# Patient Record
Sex: MALE | Race: WHITE
[De-identification: names, ages, dates, MRNs, and addresses within clinical notes are randomized per-mention and may not be internally consistent; named-entity substitution may affect disease eponyms.]

---

## 2019-02-19 ENCOUNTER — HOSPITAL ENCOUNTER (EMERGENCY)
Dept: HOSPITAL 61 - ER | Age: 19
Discharge: HOME | End: 2019-02-19
Payer: COMMERCIAL

## 2019-02-19 VITALS — HEIGHT: 70 IN | BODY MASS INDEX: 25.48 KG/M2 | WEIGHT: 178 LBS

## 2019-02-19 VITALS — DIASTOLIC BLOOD PRESSURE: 86 MMHG | SYSTOLIC BLOOD PRESSURE: 134 MMHG

## 2019-02-19 DIAGNOSIS — J02.9: ICD-10-CM

## 2019-02-19 DIAGNOSIS — F41.9: Primary | ICD-10-CM

## 2019-02-19 PROCEDURE — 99284 EMERGENCY DEPT VISIT MOD MDM: CPT

## 2019-02-19 NOTE — PHYS DOC
Past Medical History


Past Medical History:  No Pertinent History, Anxiety


Past Surgical History:  No Surgical History


Alcohol Use:  None


Drug Use:  None





Adult General


Chief Complaint


Chief Complaint:  SORE THROAT





HPI


HPI





Patient is a 19  year old male with history of anxiety who presents to the ED 

today complaining of what he believes is an abnormal growth on his throat that 

he noted recently. He states he took a picture of it. He showed me the picture, 

this is his epiglottis. Patient presents in the ED with the grandmother, they 

said there was seen at urgent care. Patient was checked for strep infection 

which was negative. Patient is very concerned about this "growth". He states 

his throat is almost dry. Patient appears anxious asking question after 

question about this "growth"





Off note i have seen this patient in the ED a month ago with chest pain and 

anxiety. Patient had full extensive workup which was negative. We reassured 

him. Recommended he follows up with [facility] all his own primary 

care doctor or psychiatrist for counseling as well as to be put on medications. 

Patient has not followed up. He is in the emergency room with the grandmother 

who also agrees patient has anxiety and needs help





Informed patient he does not need any further workup in the emergency room. He 

is swallowing breathing and in no distress. Informed him if he feels something 

unusual is growing in his throat I can do a CT with IV contrast of his throat 

but that involves needle stick for labs and imaging specifically CT, patient 

states he does not want any lab work specifically needlesticks.





Patient was provided a doctor's list and instructed to follow-up. Also 

encouraged to follow-up with [facility].





Review of Systems


Review of Systems





Constitutional: Denies fever or chills []


Eyes: Denies change in visual acuity, redness, or eye pain []


HENT: report something in the throat.Denies nasal congestion 


Respiratory: Denies cough or shortness of breath []


Cardiovascular: No additional information not addressed in HPI []


GI: Denies abdominal pain, nausea, vomiting, bloody stools or diarrhea []


: Denies dysuria or hematuria []


Musculoskeletal: Denies back pain or joint pain []


Integument: Denies rash or skin lesions []


Neurologic: Denies headache, focal weakness or sensory changes []








All other systems were reviewed and found to be within normal limits, except as 

documented in this note.





Allergies


Allergies





Allergies








Coded Allergies Type Severity Reaction Last Updated Verified


 


  No Known Drug Allergies    1/6/19 No











Physical Exam


Physical Exam





Constitutional: Well developed, well nourished, no acute distress, non-toxic 

appearance. []


HENT: Normocephalic, atraumatic, bilateral external ears normal, oropharynx 

moist, no oral exudates, nose normal. []


No abnormal growth noted in the area of concerned epiglottis is the thing he is 

concerned about.


Eyes: PERRLA, EOMI, conjunctiva normal, no discharge. [] 


Neck: Normal range of motion, no tenderness, supple, no stridor. [] 


Cardiovascular:Heart rate regular rhythm, no murmur []


Lungs & Thorax:  Bilateral breath sounds clear to auscultation []


Abdomen: Bowel sounds normal, soft, no tenderness, no masses, no pulsatile 

masses. [] 


Skin: Warm, dry, no erythema, no rash. [] 


Back: No tenderness, no CVA tenderness. [] 


Extremities: No tenderness, no cyanosis, no clubbing, ROM intact, no edema. [] 


Neurologic: Alert and oriented X 3, normal motor function, normal sensory 

function, no focal deficits noted. []


Psychologic: Affect normal, judgement normal, mood normal. []





EKG


EKG


[]





Radiology/Procedures


Radiology/Procedures


[]





Course & Med Decision Making


Course & Med Decision Making


Pertinent Labs and Imaging studies reviewed. (See chart for details)





see HPI





Dragon Disclaimer


Dragon Disclaimer


This electronic medical record was generated, in whole or in part, using a 

voice recognition dictation system.





Departure


Departure


Impression:  


 Primary Impression:  


 Anxiety


 Additional Impression:  


 Sore throat


Disposition:  01 HOME, SELF-CARE


Condition:  STABLE


Referrals:  


UNKNOWN PCP NAME (PCP)


follow up with your doctor next week


Patient Instructions:  Anxiety and Panic Attacks, Easy-to-Read, Sore Throat





Additional Instructions:  


You were evaluated in the emergency room, we highly encouraged you to establish 

care with a primary care doctor as well as follow-up a psychiatrist or 

[facility] as soon as you can. Consider hydrating yourself. You 

can use saltwater gargles as needed for sore throat. You can take Tylenol or 

Motrin for pain or fever.





Problem Qualifiers











LEONELA KRUGER Feb 19, 2019 15:18

## 2019-02-26 ENCOUNTER — HOSPITAL ENCOUNTER (EMERGENCY)
Dept: HOSPITAL 61 - ER | Age: 19
Discharge: HOME | End: 2019-02-26
Payer: COMMERCIAL

## 2019-02-26 VITALS — BODY MASS INDEX: 24.34 KG/M2 | HEIGHT: 70 IN | WEIGHT: 170 LBS

## 2019-02-26 VITALS — SYSTOLIC BLOOD PRESSURE: 127 MMHG | DIASTOLIC BLOOD PRESSURE: 69 MMHG

## 2019-02-26 DIAGNOSIS — J02.9: ICD-10-CM

## 2019-02-26 DIAGNOSIS — R11.0: ICD-10-CM

## 2019-02-26 DIAGNOSIS — R10.13: Primary | ICD-10-CM

## 2019-02-26 LAB
ALBUMIN SERPL-MCNC: 4 G/DL (ref 3.4–5)
ALBUMIN/GLOB SERPL: 1.3 {RATIO} (ref 1–1.7)
ALP SERPL-CCNC: 65 U/L (ref 46–116)
ALT SERPL-CCNC: 11 U/L (ref 16–63)
ANION GAP SERPL CALC-SCNC: 10 MMOL/L (ref 6–14)
APTT PPP: YELLOW S
AST SERPL-CCNC: 17 U/L (ref 15–37)
BACTERIA #/AREA URNS HPF: 0 /HPF
BASOPHILS # BLD AUTO: 0.1 X10^3/UL (ref 0–0.2)
BASOPHILS NFR BLD: 1 % (ref 0–3)
BILIRUB SERPL-MCNC: 2.3 MG/DL (ref 0.2–1)
BILIRUB UR QL STRIP: NEGATIVE
BUN SERPL-MCNC: 10 MG/DL (ref 8–26)
BUN/CREAT SERPL: 9 (ref 6–20)
CALCIUM SERPL-MCNC: 8.7 MG/DL (ref 8.5–10.1)
CHLORIDE SERPL-SCNC: 104 MMOL/L (ref 98–107)
CO2 SERPL-SCNC: 30 MMOL/L (ref 21–32)
CREAT SERPL-MCNC: 1.1 MG/DL (ref 0.7–1.3)
EOSINOPHIL NFR BLD: 0.2 X10^3/UL (ref 0–0.7)
EOSINOPHIL NFR BLD: 1 % (ref 0–3)
ERYTHROCYTE [DISTWIDTH] IN BLOOD BY AUTOMATED COUNT: 12.6 % (ref 11.5–14.5)
FIBRINOGEN PPP-MCNC: CLEAR MG/DL
GFR SERPLBLD BASED ON 1.73 SQ M-ARVRAT: 84.5 ML/MIN
GLOBULIN SER-MCNC: 3.2 G/DL (ref 2.2–3.8)
GLUCOSE SERPL-MCNC: 115 MG/DL (ref 70–99)
HCT VFR BLD CALC: 45.1 % (ref 39–53)
HGB BLD-MCNC: 15.4 G/DL (ref 13–17.5)
LIPASE: 174 U/L (ref 73–393)
LYMPHOCYTES # BLD: 2.5 X10^3/UL (ref 1–4.8)
LYMPHOCYTES NFR BLD AUTO: 21 % (ref 24–48)
MCH RBC QN AUTO: 30 PG (ref 25–35)
MCHC RBC AUTO-ENTMCNC: 34 G/DL (ref 31–37)
MCV RBC AUTO: 88 FL (ref 79–100)
MONO #: 0.7 X10^3/UL (ref 0–1.1)
MONOCYTES NFR BLD: 6 % (ref 0–9)
NEUT #: 8.3 X10^3UL (ref 1.8–7.7)
NEUTROPHILS NFR BLD AUTO: 71 % (ref 31–73)
NITRITE UR QL STRIP: NEGATIVE
PH UR STRIP: 6 [PH]
PLATELET # BLD AUTO: 255 X10^3/UL (ref 140–400)
POTASSIUM SERPL-SCNC: 3.3 MMOL/L (ref 3.5–5.1)
PROT SERPL-MCNC: 7.2 G/DL (ref 6.4–8.2)
PROT UR STRIP-MCNC: NEGATIVE MG/DL
RBC # BLD AUTO: 5.13 X10^6/UL (ref 4.3–5.7)
RBC #/AREA URNS HPF: (no result) /HPF (ref 0–2)
SODIUM SERPL-SCNC: 144 MMOL/L (ref 136–145)
SQUAMOUS #/AREA URNS LPF: (no result) /LPF
UROBILINOGEN UR-MCNC: 0.2 MG/DL
WBC # BLD AUTO: 11.7 X10^3/UL (ref 4–11)
WBC #/AREA URNS HPF: (no result) /HPF (ref 0–4)

## 2019-02-26 PROCEDURE — 80053 COMPREHEN METABOLIC PANEL: CPT

## 2019-02-26 PROCEDURE — 36415 COLL VENOUS BLD VENIPUNCTURE: CPT

## 2019-02-26 PROCEDURE — 96375 TX/PRO/DX INJ NEW DRUG ADDON: CPT

## 2019-02-26 PROCEDURE — 87070 CULTURE OTHR SPECIMN AEROBIC: CPT

## 2019-02-26 PROCEDURE — 81001 URINALYSIS AUTO W/SCOPE: CPT

## 2019-02-26 PROCEDURE — 85025 COMPLETE CBC W/AUTO DIFF WBC: CPT

## 2019-02-26 PROCEDURE — 87880 STREP A ASSAY W/OPTIC: CPT

## 2019-02-26 PROCEDURE — 83690 ASSAY OF LIPASE: CPT

## 2019-02-26 PROCEDURE — 99283 EMERGENCY DEPT VISIT LOW MDM: CPT

## 2019-02-26 PROCEDURE — 96374 THER/PROPH/DIAG INJ IV PUSH: CPT

## 2019-02-26 NOTE — PHYS DOC
Past Medical History


Past Medical History:  No Pertinent History, Anxiety


Past Surgical History:  No Surgical History


Alcohol Use:  Occasionally


Drug Use:  None





Adult General


Chief Complaint


Chief Complaint:  ABDOMINAL PAIN





HPI


HPI





Patient is a 19  year old female who presents with 2 days of epigastric 

abdominal pain described as burning and cramping he drank heavily from 

Wednesday through Saturday with his buddies and he woke up and had this pain he 

was having some nausea. A scratchy throat a bit of a sore throat no fever that 

he knows of he was getting anxious as he couldn't sleep so he came to the 

emergency room for evaluation. No lower abdominal pain no dysuria.





Review of Systems


Review of Systems





Constitutional: Denies fever or chills []


Eyes: Denies change in visual acuity, redness, or eye pain []


HENT: Denies nasal congestion or sore throat []


Respiratory: Denies cough or shortness of breath []


Cardiovascular: No additional information not addressed in HPI []


GI: Denies abdominal pain, nausea, vomiting, bloody stools or diarrhea []


: Denies dysuria or hematuria []


Musculoskeletal: Denies back pain or joint pain []


Integument: Denies rash or skin lesions []


Neurologic: Denies headache, focal weakness or sensory changes []


Endocrine: Denies polyuria or polydipsia []





All other systems were reviewed and found to be within normal limits, except as 

documented in this note.





Current Medications


Current Medications





Current Medications








 Medications


  (Trade)  Dose


 Ordered  Sig/Janes  Start Time


 Stop Time Status Last Admin


Dose Admin


 


 Ketorolac


 Tromethamine


  (Toradol 15mg


 Vial)  15 mg  1X  ONCE  2/26/19 03:00


 2/26/19 03:01 DC 2/26/19 03:27


15 MG


 


 Lorazepam


  (Ativan)  1 mg  1X  ONCE  2/26/19 02:30


 2/26/19 02:31 DC  





 


 Multi-Ingredient


 Mouthwash/Gargle


  (Gi Cocktail)  20 ml  1X  ONCE  2/26/19 02:30


 2/26/19 02:31 DC 2/26/19 02:44


20 ML


 


 Ondansetron HCl


  (Zofran)  4 mg  1X  ONCE  2/26/19 02:30


 2/26/19 02:31 DC 2/26/19 02:44


4 MG


 


 Sodium Chloride  1,000 ml @ 


 1,000 mls/hr  1X  ONCE  2/26/19 02:30


 2/26/19 03:29 DC 2/26/19 02:29


1,000 MLS/HR











Allergies


Allergies





Allergies








Coded Allergies Type Severity Reaction Last Updated Verified


 


  No Known Drug Allergies    1/6/19 No











Physical Exam


Physical Exam





Constitutional: Well developed, well nourished, no acute distress, non-toxic 

appearance. []


HENT: Normocephalic, atraumatic, bilateral external ears normal, oropharynx 

moist, no oral exudates, nose normal. []


Eyes: PERRLA, EOMI, conjunctiva normal, no discharge. [] no lad


Neck: Normal range of motion, no tenderness, supple, no stridor. [] 


Cardiovascular:Heart rate regular rhythm, no murmur []


Lungs & Thorax:  Bilateral breath sounds clear to auscultation []


Abdomen: Bowel sounds normal, soft, mild epigastric ttp noted.


Extremities: No tenderness, no cyanosis, no clubbing, ROM intact, no edema. [] 


Neurologic: Alert and oriented X 3, normal motor function, normal sensory 

function, no focal deficits noted. []


Psychologic: Affect normal, judgement normal, mood normal. []





Current Patient Data


Lab Values





 Laboratory Tests








Test


 2/26/19


01:58 2/26/19


02:18


 


Urine Collection Type Unknown   


 


Urine Color Yellow   


 


Urine Clarity Clear   


 


Urine pH 6.0   


 


Urine Specific Gravity 1.010   


 


Urine Protein


 Negative mg/dL


(NEG-TRACE) 





 


Urine Glucose (UA)


 Negative mg/dL


(NEG) 





 


Urine Ketones (Stick)


 Negative mg/dL


(NEG) 





 


Urine Blood


 Negative (NEG)


 





 


Urine Nitrite


 Negative (NEG)


 





 


Urine Bilirubin


 Negative (NEG)


 





 


Urine Urobilinogen Dipstick


 0.2 mg/dL (0.2


mg/dL) 





 


Urine Leukocyte Esterase


 Negative (NEG)


 





 


Urine RBC


 Occ /HPF (0-2)


 





 


Urine WBC


 1-4 /HPF (0-4)


 





 


Urine Squamous Epithelial


Cells Few /LPF  


 





 


Urine Bacteria


 0 /HPF (0-FEW)


 





 


Urine Mucus Mod /LPF   


 


White Blood Count


 


 11.7 x10^3/uL


(4.0-11.0)  H


 


Red Blood Count


 


 5.13 x10^6/uL


(4.30-5.70)


 


Hemoglobin


 


 15.4 g/dL


(13.0-17.5)


 


Hematocrit


 


 45.1 %


(39.0-53.0)


 


Mean Corpuscular Volume


 


 88 fL ()





 


Mean Corpuscular Hemoglobin  30 pg (25-35)  


 


Mean Corpuscular Hemoglobin


Concent 


 34 g/dL


(31-37)


 


Red Cell Distribution Width


 


 12.6 %


(11.5-14.5)


 


Platelet Count


 


 255 x10^3/uL


(140-400)


 


Neutrophils (%) (Auto)  71 % (31-73)  


 


Lymphocytes (%) (Auto)  21 % (24-48)  L


 


Monocytes (%) (Auto)  6 % (0-9)  


 


Eosinophils (%) (Auto)  1 % (0-3)  


 


Basophils (%) (Auto)  1 % (0-3)  


 


Neutrophils # (Auto)


 


 8.3 x10^3uL


(1.8-7.7)  H


 


Lymphocytes # (Auto)


 


 2.5 x10^3/uL


(1.0-4.8)


 


Monocytes # (Auto)


 


 0.7 x10^3/uL


(0.0-1.1)


 


Eosinophils # (Auto)


 


 0.2 x10^3/uL


(0.0-0.7)


 


Basophils # (Auto)


 


 0.1 x10^3/uL


(0.0-0.2)


 


Sodium Level


 


 144 mmol/L


(136-145)


 


Potassium Level


 


 3.3 mmol/L


(3.5-5.1)  L


 


Chloride Level


 


 104 mmol/L


()


 


Carbon Dioxide Level


 


 30 mmol/L


(21-32)


 


Anion Gap  10 (6-14)  


 


Blood Urea Nitrogen


 


 10 mg/dL


(8-26)


 


Creatinine


 


 1.1 mg/dL


(0.7-1.3)


 


Estimated GFR


(Cockcroft-Gault) 


 84.5  





 


BUN/Creatinine Ratio  9 (6-20)  


 


Glucose Level


 


 115 mg/dL


(70-99)  H


 


Calcium Level


 


 8.7 mg/dL


(8.5-10.1)


 


Total Bilirubin


 


 2.3 mg/dL


(0.2-1.0)  H


 


Aspartate Amino Transferase


(AST) 


 17 U/L (15-37)





 


Alanine Aminotransferase (ALT)


 


 11 U/L (16-63)


L


 


Alkaline Phosphatase


 


 65 U/L


()


 


Total Protein


 


 7.2 g/dL


(6.4-8.2)


 


Albumin


 


 4.0 g/dL


(3.4-5.0)


 


Albumin/Globulin Ratio  1.3 (1.0-1.7)  


 


Lipase


 


 174 U/L


()


 


Ethyl Alcohol Level


 


 < 10 mg/dL


(0-10)





 Laboratory Tests


2/26/19 02:18








 Laboratory Tests


2/26/19 02:18














EKG


EKG


[]





Radiology/Procedures


Radiology/Procedures


[]





Course & Med Decision Making


Course & Med Decision Making


Pertinent Labs and Imaging studies reviewed. (See chart for details)





labs and test results look basically normal


probable alcohol type gastritis


recommended antacid. 


[]Significant anxiety component noted. Reassure the patient as best I could





Dragon Disclaimer


Dragon Disclaimer


This electronic medical record was generated, in whole or in part, using a 

voice recognition dictation system.





Departure


Departure


Impression:  


 Primary Impression:  


 Abdominal pain


Disposition:  01 HOME, SELF-CARE


Condition:  STABLE


Referrals:  


MARCOS ABDI (PCP)


Scripts


Omeprazole (OMEPRAZOLE) 40 Mg Capsule.dr


1 CAP PO DAILY, #30 CAP 0 Refills


   Prov: RAFAEL UMANA MD         2/26/19











RAFAEL UMANA MD Feb 26, 2019 03:08